# Patient Record
Sex: MALE
[De-identification: names, ages, dates, MRNs, and addresses within clinical notes are randomized per-mention and may not be internally consistent; named-entity substitution may affect disease eponyms.]

---

## 2017-10-15 ENCOUNTER — CHARTING TRANS (OUTPATIENT)
Dept: OTHER | Age: 72
End: 2017-10-15

## 2018-02-16 PROBLEM — N40.1 BPH WITH OBSTRUCTION/LOWER URINARY TRACT SYMPTOMS: Status: ACTIVE | Noted: 2018-02-16

## 2018-02-16 PROBLEM — N13.8 BPH WITH OBSTRUCTION/LOWER URINARY TRACT SYMPTOMS: Status: ACTIVE | Noted: 2018-02-16

## 2018-02-16 PROCEDURE — 86803 HEPATITIS C AB TEST: CPT | Performed by: FAMILY MEDICINE

## 2018-10-16 ENCOUNTER — APPOINTMENT (OUTPATIENT)
Dept: GENERAL RADIOLOGY | Facility: HOSPITAL | Age: 73
End: 2018-10-16
Attending: EMERGENCY MEDICINE
Payer: MEDICARE

## 2018-10-16 ENCOUNTER — HOSPITAL ENCOUNTER (EMERGENCY)
Facility: HOSPITAL | Age: 73
Discharge: HOME | End: 2018-10-16
Attending: EMERGENCY MEDICINE
Payer: MEDICARE

## 2018-10-16 VITALS
HEIGHT: 65 IN | OXYGEN SATURATION: 96 % | RESPIRATION RATE: 17 BRPM | DIASTOLIC BLOOD PRESSURE: 76 MMHG | TEMPERATURE: 98 F | HEART RATE: 57 BPM | BODY MASS INDEX: 27.32 KG/M2 | SYSTOLIC BLOOD PRESSURE: 156 MMHG | WEIGHT: 164 LBS

## 2018-10-16 DIAGNOSIS — R68.84 JAW PAIN: Primary | ICD-10-CM

## 2018-10-16 DIAGNOSIS — R42 LIGHTHEADED: ICD-10-CM

## 2018-10-16 PROCEDURE — 99285 EMERGENCY DEPT VISIT HI MDM: CPT

## 2018-10-16 PROCEDURE — 81003 URINALYSIS AUTO W/O SCOPE: CPT | Performed by: EMERGENCY MEDICINE

## 2018-10-16 PROCEDURE — 93010 ELECTROCARDIOGRAM REPORT: CPT

## 2018-10-16 PROCEDURE — 80053 COMPREHEN METABOLIC PANEL: CPT | Performed by: EMERGENCY MEDICINE

## 2018-10-16 PROCEDURE — 85025 COMPLETE CBC W/AUTO DIFF WBC: CPT | Performed by: EMERGENCY MEDICINE

## 2018-10-16 PROCEDURE — 93005 ELECTROCARDIOGRAM TRACING: CPT

## 2018-10-16 PROCEDURE — 71045 X-RAY EXAM CHEST 1 VIEW: CPT | Performed by: EMERGENCY MEDICINE

## 2018-10-16 PROCEDURE — 96360 HYDRATION IV INFUSION INIT: CPT

## 2018-10-16 PROCEDURE — 84484 ASSAY OF TROPONIN QUANT: CPT | Performed by: EMERGENCY MEDICINE

## 2018-10-16 RX ORDER — POTASSIUM CHLORIDE 20 MEQ/1
40 TABLET, EXTENDED RELEASE ORAL ONCE
Status: COMPLETED | OUTPATIENT
Start: 2018-10-16 | End: 2018-10-16

## 2018-10-16 RX ORDER — SODIUM CHLORIDE 9 MG/ML
125 INJECTION, SOLUTION INTRAVENOUS CONTINUOUS
Status: DISCONTINUED | OUTPATIENT
Start: 2018-10-16 | End: 2018-10-16

## 2018-10-16 RX ORDER — ASPIRIN 81 MG/1
324 TABLET, CHEWABLE ORAL ONCE
Status: COMPLETED | OUTPATIENT
Start: 2018-10-16 | End: 2018-10-16

## 2018-10-16 NOTE — ED INITIAL ASSESSMENT (HPI)
Patient presents with an episode of lightheadedness and diaphoresis that lasted about ten minutes. He states this occurred about 20 minutes prior to arrival and he continues to have a little lightheadedness. He states he just doesn't feel 100%.  Denies ches

## 2018-10-16 NOTE — ED PROVIDER NOTES
Patient Seen in: [facility] Emergency Department    History   Patient presents with:  Dizziness (neurologic)    Stated Complaint: Dizziness , diaphoresis that started 20min ago.  No chest pain    HPI    Patient is a 43-year-old male who states today LUMBAR/SACRAL, SINGLE LEVEL  12/4/2013    Procedure: TRANSFORAMINAL EPIDURAL - LUMBAR;  Surgeon: Janessa Hinkle MD;  Location: [facility]   • INJECTION, ANESTHETIC/STEROID, TRANSFORAMINAL EPIDURAL; LUMBAR/SACRAL, SINGLE LEVEL  12/30/ EPIDURAL - LUMBAR Left 12/4/2013    Performed by Avi Viveros MD at [address]   • TRANSFORAMINAL EPIDURAL - LUMBAR Left 11/8/2013    Performed by Avi Viveros MD at [address] Result Value    Glucose 115 (*)     Potassium 3.5 (*)     BUN 23 (*)     BUN/CREA Ratio 21.5 (*)     Calculated Osmolality 301 (*)     Alkaline Phosphatase 126 (*)     All other components within normal limits   TROPONIN I - Normal   CBC WITH DIFFERE

## 2018-12-14 PROBLEM — Z96.651 PRESENCE OF RIGHT ARTIFICIAL KNEE JOINT: Status: ACTIVE | Noted: 2018-11-27

## 2020-03-02 ENCOUNTER — ANESTHESIA EVENT (OUTPATIENT)
Dept: SURGERY | Facility: HOSPITAL | Age: 75
End: 2020-03-02
Payer: MEDICARE

## 2020-03-02 ENCOUNTER — APPOINTMENT (OUTPATIENT)
Dept: GENERAL RADIOLOGY | Facility: HOSPITAL | Age: 75
End: 2020-03-02
Attending: EMERGENCY MEDICINE
Payer: MEDICARE

## 2020-03-02 ENCOUNTER — ANESTHESIA (OUTPATIENT)
Dept: SURGERY | Facility: HOSPITAL | Age: 75
End: 2020-03-02
Payer: MEDICARE

## 2020-03-02 ENCOUNTER — HOSPITAL ENCOUNTER (EMERGENCY)
Facility: HOSPITAL | Age: 75
Discharge: HOME | End: 2020-03-02
Attending: EMERGENCY MEDICINE
Payer: MEDICARE

## 2020-03-02 ENCOUNTER — APPOINTMENT (OUTPATIENT)
Dept: GENERAL RADIOLOGY | Facility: HOSPITAL | Age: 75
End: 2020-03-02
Attending: ORTHOPAEDIC SURGERY
Payer: MEDICARE

## 2020-03-02 VITALS
HEART RATE: 70 BPM | RESPIRATION RATE: 15 BRPM | BODY MASS INDEX: 25.18 KG/M2 | HEIGHT: 69.02 IN | OXYGEN SATURATION: 98 % | SYSTOLIC BLOOD PRESSURE: 135 MMHG | TEMPERATURE: 98 F | WEIGHT: 170 LBS | DIASTOLIC BLOOD PRESSURE: 69 MMHG

## 2020-03-02 DIAGNOSIS — S62.634B OPEN DISPLACED FRACTURE OF DISTAL PHALANX OF RIGHT RING FINGER, INITIAL ENCOUNTER: Primary | ICD-10-CM

## 2020-03-02 DIAGNOSIS — S62.662B OPEN NONDISPLACED FRACTURE OF DISTAL PHALANX OF RIGHT MIDDLE FINGER, INITIAL ENCOUNTER: ICD-10-CM

## 2020-03-02 LAB
ALBUMIN SERPL-MCNC: 4 G/DL (ref 3.4–5)
ALBUMIN/GLOB SERPL: 1.1 {RATIO} (ref 1–2)
ALP LIVER SERPL-CCNC: 94 U/L (ref 45–117)
ALT SERPL-CCNC: 47 U/L (ref 16–61)
ANION GAP SERPL CALC-SCNC: 7 MMOL/L (ref 0–18)
APTT PPP: 24.5 SECONDS (ref 25.4–36.1)
AST SERPL-CCNC: 20 U/L (ref 15–37)
ATRIAL RATE: 66 BPM
BASOPHILS # BLD AUTO: 0.04 X10(3) UL (ref 0–0.2)
BASOPHILS NFR BLD AUTO: 0.7 %
BILIRUB SERPL-MCNC: 0.7 MG/DL (ref 0.1–2)
BUN BLD-MCNC: 51 MG/DL (ref 7–18)
BUN/CREAT SERPL: 26.3 (ref 10–20)
CALCIUM BLD-MCNC: 9 MG/DL (ref 8.5–10.1)
CHLORIDE SERPL-SCNC: 112 MMOL/L (ref 98–112)
CO2 SERPL-SCNC: 21 MMOL/L (ref 21–32)
CREAT BLD-MCNC: 1.94 MG/DL (ref 0.7–1.3)
DEPRECATED RDW RBC AUTO: 41.1 FL (ref 35.1–46.3)
EOSINOPHIL # BLD AUTO: 0.21 X10(3) UL (ref 0–0.7)
EOSINOPHIL NFR BLD AUTO: 3.6 %
ERYTHROCYTE [DISTWIDTH] IN BLOOD BY AUTOMATED COUNT: 13.2 % (ref 11–15)
GLOBULIN PLAS-MCNC: 3.6 G/DL (ref 2.8–4.4)
GLUCOSE BLD-MCNC: 116 MG/DL (ref 70–99)
HCT VFR BLD AUTO: 37.1 % (ref 39–53)
HGB BLD-MCNC: 13 G/DL (ref 13–17.5)
IMM GRANULOCYTES # BLD AUTO: 0.04 X10(3) UL (ref 0–1)
IMM GRANULOCYTES NFR BLD: 0.7 %
INR BLD: 1.02 (ref 0.9–1.1)
LYMPHOCYTES # BLD AUTO: 1.76 X10(3) UL (ref 1–4)
LYMPHOCYTES NFR BLD AUTO: 30.3 %
M PROTEIN MFR SERPL ELPH: 7.6 G/DL (ref 6.4–8.2)
MCH RBC QN AUTO: 30.5 PG (ref 26–34)
MCHC RBC AUTO-ENTMCNC: 35 G/DL (ref 31–37)
MCV RBC AUTO: 87.1 FL (ref 80–100)
MONOCYTES # BLD AUTO: 0.67 X10(3) UL (ref 0.1–1)
MONOCYTES NFR BLD AUTO: 11.6 %
NEUTROPHILS # BLD AUTO: 3.08 X10 (3) UL (ref 1.5–7.7)
NEUTROPHILS # BLD AUTO: 3.08 X10(3) UL (ref 1.5–7.7)
NEUTROPHILS NFR BLD AUTO: 53.1 %
OSMOLALITY SERPL CALC.SUM OF ELEC: 305 MOSM/KG (ref 275–295)
P AXIS: 14 DEGREES
P-R INTERVAL: 166 MS
PLATELET # BLD AUTO: 121 10(3)UL (ref 150–450)
POTASSIUM SERPL-SCNC: 4.3 MMOL/L (ref 3.5–5.1)
PSA SERPL DL<=0.01 NG/ML-MCNC: 13.8 SECONDS (ref 12.5–14.7)
Q-T INTERVAL: 388 MS
QRS DURATION: 84 MS
QTC CALCULATION (BEZET): 406 MS
R AXIS: 1 DEGREES
RBC # BLD AUTO: 4.26 X10(6)UL (ref 3.8–5.8)
SODIUM SERPL-SCNC: 140 MMOL/L (ref 136–145)
T AXIS: 38 DEGREES
VENTRICULAR RATE: 66 BPM
WBC # BLD AUTO: 5.8 X10(3) UL (ref 4–11)

## 2020-03-02 PROCEDURE — 71045 X-RAY EXAM CHEST 1 VIEW: CPT | Performed by: EMERGENCY MEDICINE

## 2020-03-02 PROCEDURE — 85025 COMPLETE CBC W/AUTO DIFF WBC: CPT | Performed by: EMERGENCY MEDICINE

## 2020-03-02 PROCEDURE — 93005 ELECTROCARDIOGRAM TRACING: CPT

## 2020-03-02 PROCEDURE — 90471 IMMUNIZATION ADMIN: CPT

## 2020-03-02 PROCEDURE — 0X6S0Z3 DETACHMENT AT RIGHT RING FINGER, LOW, OPEN APPROACH: ICD-10-PCS | Performed by: ORTHOPAEDIC SURGERY

## 2020-03-02 PROCEDURE — 85610 PROTHROMBIN TIME: CPT | Performed by: EMERGENCY MEDICINE

## 2020-03-02 PROCEDURE — 96361 HYDRATE IV INFUSION ADD-ON: CPT

## 2020-03-02 PROCEDURE — 96365 THER/PROPH/DIAG IV INF INIT: CPT

## 2020-03-02 PROCEDURE — 0X6Q0Z3 DETACHMENT AT RIGHT MIDDLE FINGER, LOW, OPEN APPROACH: ICD-10-PCS | Performed by: ORTHOPAEDIC SURGERY

## 2020-03-02 PROCEDURE — 99285 EMERGENCY DEPT VISIT HI MDM: CPT

## 2020-03-02 PROCEDURE — 85730 THROMBOPLASTIN TIME PARTIAL: CPT | Performed by: EMERGENCY MEDICINE

## 2020-03-02 PROCEDURE — 76000 FLUOROSCOPY <1 HR PHYS/QHP: CPT | Performed by: ORTHOPAEDIC SURGERY

## 2020-03-02 PROCEDURE — 96375 TX/PRO/DX INJ NEW DRUG ADDON: CPT

## 2020-03-02 PROCEDURE — 73130 X-RAY EXAM OF HAND: CPT | Performed by: EMERGENCY MEDICINE

## 2020-03-02 PROCEDURE — 93010 ELECTROCARDIOGRAM REPORT: CPT

## 2020-03-02 PROCEDURE — 80053 COMPREHEN METABOLIC PANEL: CPT | Performed by: EMERGENCY MEDICINE

## 2020-03-02 RX ORDER — CEFAZOLIN SODIUM/WATER 2 G/20 ML
SYRINGE (ML) INTRAVENOUS AS NEEDED
Status: DISCONTINUED | OUTPATIENT
Start: 2020-03-02 | End: 2020-03-02 | Stop reason: SURG

## 2020-03-02 RX ORDER — ONDANSETRON 2 MG/ML
4 INJECTION INTRAMUSCULAR; INTRAVENOUS AS NEEDED
Status: DISCONTINUED | OUTPATIENT
Start: 2020-03-02 | End: 2020-03-02

## 2020-03-02 RX ORDER — CEPHALEXIN 500 MG/1
500 CAPSULE ORAL 4 TIMES DAILY
Qty: 20 CAPSULE | Refills: 0 | Status: SHIPPED | OUTPATIENT
Start: 2020-03-02 | End: 2020-03-07

## 2020-03-02 RX ORDER — HYDROMORPHONE HYDROCHLORIDE 1 MG/ML
0.4 INJECTION, SOLUTION INTRAMUSCULAR; INTRAVENOUS; SUBCUTANEOUS EVERY 5 MIN PRN
Status: DISCONTINUED | OUTPATIENT
Start: 2020-03-02 | End: 2020-03-02

## 2020-03-02 RX ORDER — HYDROCODONE BITARTRATE AND ACETAMINOPHEN 5; 325 MG/1; MG/1
2 TABLET ORAL AS NEEDED
Status: DISCONTINUED | OUTPATIENT
Start: 2020-03-02 | End: 2020-03-02

## 2020-03-02 RX ORDER — EPHEDRINE SULFATE 50 MG/ML
INJECTION, SOLUTION INTRAVENOUS AS NEEDED
Status: DISCONTINUED | OUTPATIENT
Start: 2020-03-02 | End: 2020-03-02 | Stop reason: SURG

## 2020-03-02 RX ORDER — ACETAMINOPHEN 500 MG
1000 TABLET ORAL ONCE AS NEEDED
Status: DISCONTINUED | OUTPATIENT
Start: 2020-03-02 | End: 2020-03-02

## 2020-03-02 RX ORDER — MORPHINE SULFATE 4 MG/ML
4 INJECTION, SOLUTION INTRAMUSCULAR; INTRAVENOUS ONCE
Status: COMPLETED | OUTPATIENT
Start: 2020-03-02 | End: 2020-03-02

## 2020-03-02 RX ORDER — BUPIVACAINE HYDROCHLORIDE 5 MG/ML
INJECTION, SOLUTION EPIDURAL; INTRACAUDAL AS NEEDED
Status: DISCONTINUED | OUTPATIENT
Start: 2020-03-02 | End: 2020-03-02 | Stop reason: HOSPADM

## 2020-03-02 RX ORDER — KETOROLAC TROMETHAMINE 30 MG/ML
INJECTION, SOLUTION INTRAMUSCULAR; INTRAVENOUS AS NEEDED
Status: DISCONTINUED | OUTPATIENT
Start: 2020-03-02 | End: 2020-03-02 | Stop reason: SURG

## 2020-03-02 RX ORDER — ATORVASTATIN CALCIUM 40 MG/1
40 TABLET, FILM COATED ORAL NIGHTLY
COMMUNITY
End: 2020-10-07

## 2020-03-02 RX ORDER — LABETALOL HYDROCHLORIDE 5 MG/ML
5 INJECTION, SOLUTION INTRAVENOUS EVERY 5 MIN PRN
Status: DISCONTINUED | OUTPATIENT
Start: 2020-03-02 | End: 2020-03-02

## 2020-03-02 RX ORDER — ONDANSETRON 2 MG/ML
4 INJECTION INTRAMUSCULAR; INTRAVENOUS ONCE
Status: COMPLETED | OUTPATIENT
Start: 2020-03-02 | End: 2020-03-02

## 2020-03-02 RX ORDER — DEXAMETHASONE SODIUM PHOSPHATE 4 MG/ML
VIAL (ML) INJECTION AS NEEDED
Status: DISCONTINUED | OUTPATIENT
Start: 2020-03-02 | End: 2020-03-02 | Stop reason: SURG

## 2020-03-02 RX ORDER — HYDROCODONE BITARTRATE AND ACETAMINOPHEN 5; 325 MG/1; MG/1
1 TABLET ORAL AS NEEDED
Status: DISCONTINUED | OUTPATIENT
Start: 2020-03-02 | End: 2020-03-02

## 2020-03-02 RX ORDER — BENAZEPRIL HYDROCHLORIDE 40 MG/1
40 TABLET, FILM COATED ORAL DAILY
COMMUNITY
End: 2020-10-07

## 2020-03-02 RX ORDER — SODIUM CHLORIDE, SODIUM LACTATE, POTASSIUM CHLORIDE, CALCIUM CHLORIDE 600; 310; 30; 20 MG/100ML; MG/100ML; MG/100ML; MG/100ML
INJECTION, SOLUTION INTRAVENOUS CONTINUOUS PRN
Status: DISCONTINUED | OUTPATIENT
Start: 2020-03-02 | End: 2020-03-02 | Stop reason: SURG

## 2020-03-02 RX ORDER — NALOXONE HYDROCHLORIDE 0.4 MG/ML
80 INJECTION, SOLUTION INTRAMUSCULAR; INTRAVENOUS; SUBCUTANEOUS AS NEEDED
Status: DISCONTINUED | OUTPATIENT
Start: 2020-03-02 | End: 2020-03-02

## 2020-03-02 RX ORDER — HYDROCODONE BITARTRATE AND ACETAMINOPHEN 5; 325 MG/1; MG/1
1 TABLET ORAL EVERY 6 HOURS PRN
Qty: 30 TABLET | Refills: 0 | Status: SHIPPED | OUTPATIENT
Start: 2020-03-02 | End: 2020-11-06

## 2020-03-02 RX ORDER — SODIUM CHLORIDE 9 MG/ML
1000 INJECTION, SOLUTION INTRAVENOUS ONCE
Status: COMPLETED | OUTPATIENT
Start: 2020-03-02 | End: 2020-03-02

## 2020-03-02 RX ORDER — LIDOCAINE HYDROCHLORIDE 10 MG/ML
INJECTION, SOLUTION EPIDURAL; INFILTRATION; INTRACAUDAL; PERINEURAL AS NEEDED
Status: DISCONTINUED | OUTPATIENT
Start: 2020-03-02 | End: 2020-03-02 | Stop reason: SURG

## 2020-03-02 RX ORDER — AMLODIPINE BESYLATE 10 MG/1
10 TABLET ORAL DAILY
COMMUNITY
End: 2020-06-28

## 2020-03-02 RX ORDER — MIDAZOLAM HYDROCHLORIDE 1 MG/ML
1 INJECTION INTRAMUSCULAR; INTRAVENOUS EVERY 5 MIN PRN
Status: DISCONTINUED | OUTPATIENT
Start: 2020-03-02 | End: 2020-03-02

## 2020-03-02 RX ORDER — LIDOCAINE HYDROCHLORIDE 10 MG/ML
INJECTION, SOLUTION INFILTRATION; PERINEURAL AS NEEDED
Status: DISCONTINUED | OUTPATIENT
Start: 2020-03-02 | End: 2020-03-02 | Stop reason: HOSPADM

## 2020-03-02 RX ORDER — METOCLOPRAMIDE HYDROCHLORIDE 5 MG/ML
10 INJECTION INTRAMUSCULAR; INTRAVENOUS AS NEEDED
Status: DISCONTINUED | OUTPATIENT
Start: 2020-03-02 | End: 2020-03-02

## 2020-03-02 RX ORDER — MEPERIDINE HYDROCHLORIDE 25 MG/ML
12.5 INJECTION INTRAMUSCULAR; INTRAVENOUS; SUBCUTANEOUS AS NEEDED
Status: DISCONTINUED | OUTPATIENT
Start: 2020-03-02 | End: 2020-03-02

## 2020-03-02 RX ORDER — SODIUM CHLORIDE 9 MG/ML
125 INJECTION, SOLUTION INTRAVENOUS CONTINUOUS
Status: DISCONTINUED | OUTPATIENT
Start: 2020-03-02 | End: 2020-03-02

## 2020-03-02 RX ORDER — SODIUM CHLORIDE, SODIUM LACTATE, POTASSIUM CHLORIDE, CALCIUM CHLORIDE 600; 310; 30; 20 MG/100ML; MG/100ML; MG/100ML; MG/100ML
INJECTION, SOLUTION INTRAVENOUS CONTINUOUS
Status: DISCONTINUED | OUTPATIENT
Start: 2020-03-02 | End: 2020-03-02

## 2020-03-02 RX ADMIN — CEFAZOLIN SODIUM/WATER 2 G: 2 G/20 ML SYRINGE (ML) INTRAVENOUS at 15:34:00

## 2020-03-02 RX ADMIN — SODIUM CHLORIDE, SODIUM LACTATE, POTASSIUM CHLORIDE, CALCIUM CHLORIDE: 600; 310; 30; 20 INJECTION, SOLUTION INTRAVENOUS at 16:58:00

## 2020-03-02 RX ADMIN — KETOROLAC TROMETHAMINE 30 MG: 30 INJECTION, SOLUTION INTRAMUSCULAR; INTRAVENOUS at 16:49:00

## 2020-03-02 RX ADMIN — DEXAMETHASONE SODIUM PHOSPHATE 8 MG: 4 MG/ML VIAL (ML) INJECTION at 15:32:00

## 2020-03-02 RX ADMIN — EPHEDRINE SULFATE 10 MG: 50 INJECTION, SOLUTION INTRAVENOUS at 15:49:00

## 2020-03-02 RX ADMIN — LIDOCAINE HYDROCHLORIDE 50 MG: 10 INJECTION, SOLUTION EPIDURAL; INFILTRATION; INTRACAUDAL; PERINEURAL at 15:32:00

## 2020-03-02 RX ADMIN — SODIUM CHLORIDE, SODIUM LACTATE, POTASSIUM CHLORIDE, CALCIUM CHLORIDE: 600; 310; 30; 20 INJECTION, SOLUTION INTRAVENOUS at 15:28:00

## 2020-03-02 NOTE — ANESTHESIA POSTPROCEDURE EVALUATION
[address] [facility],[address] Patient Status:  Emergency   Age/Gender 69year old male MRN [id number]   [facility] SURGERY Attending Chago Santos, [address] [facility] Se Day # 0 PCP Suzette Balderas MD       Anesthesia Post-op Note    Procedure(s):  [facility]

## 2020-03-02 NOTE — ANESTHESIA PROCEDURE NOTES
Airway  Date/Time: 3/2/2020 3:36 PM  Urgency: elective      General Information and Staff    Patient location during procedure: OR  Anesthesiologist: Barb Mariscal MD  Performed: anesthesiologist     Indications and Patient Condition  Indications for a

## 2020-03-02 NOTE — ANESTHESIA PREPROCEDURE EVALUATION
PRE-OP EVALUATION    Patient Name: Hi Burciaga    Pre-op Diagnosis: Acquired deformity of finger due to trauma [M20.009]    Procedure(s):  RIGHT RING FINGER  WOUND EXPLORATION, POSSIBLE OPEN REDUCTION INTERNAL FIXATION VERSUS PARTIAL AMPUTATION    Surg (+) arthritis       Pulmonary    Negative pulmonary ROS.                        Neuro/Psych                 (+) neuromuscular disease             Allergic rhinitis due to pollen Essential hypertension, benign  Hyperlipidemia Lumbosacral sp 21.0 03/02/2020    BUN 51 (H) 03/02/2020    CREATSERUM 1.94 (H) 03/02/2020     (H) 03/02/2020    CA 9.0 03/02/2020     Lab Results   Component Value Date    INR 1.02 03/02/2020         Airway      Mallampati: II  Mouth opening: >3 FB  TM distance: >

## 2020-03-02 NOTE — ANESTHESIA POSTPROCEDURE EVALUATION
[address] [facility] Patient Status:  Emergency   Age/Gender 69year old male MRN [id number]   [facility] SURGERY Attending Kati Maxwell, [address] [facility] Se Day # 0 PCP Sree Yang MD       Anesthesia Post-op Note    Procedure(s):  [facility]

## 2020-03-02 NOTE — ED PROVIDER NOTES
Patient Seen in: [facility] Emergency Department      History   Patient presents with:  Trauma  Upper Extremity Injury    Stated Complaint: cut middle and 4th digit with a saw PTA.       HPI    Patient is a pleasant 52-year-old male, right-hand-domin Smokeless tobacco: Never Used    Alcohol use: Yes      Alcohol/week: 0.0 standard drinks    Drug use: No             Review of Systems    Positive for stated complaint: cut middle and 4th digit with a saw PTA. Other systems are as noted in HPI.   Con PTT, ACTIVATED - Abnormal; Notable for the following components:    PTT 24.5 (*)     All other components within normal limits   CBC W/ DIFFERENTIAL - Abnormal; Notable for the following components:    HCT 37.1 (*)     .0 (*)     All other component Patient was placed on the cart, an IV was established, and an infusion of normal saline was initiated. IV Ancef was ordered. X-ray of the right hand was obtained. Tetanus was updated. X-rays were reviewed.   Case was discussed with Dr. Marilee Frazier, who

## 2020-03-02 NOTE — OPERATIVE REPORT
OPERATIVE REPORT    Patient Name: Martin Richmond  Age:  69year old  Sex: male  MRN: [id number]  : 1945  Date of Admission: 3/2/2020  Date of Surgery: 20  Primary Surgeon: Zoila Amaya MD  Assistant(s): None     Preoperative Diagnosis:   Tabl high axillary tourniquet was placed and was well padded  Under sterile conditions, I performed a finger block to the right long and ring fingers around the MP joint.  I also gave him a median nerve and ulnar nerve block in safe and sterile fashion utilizing for the ulnar three fingers  The index finger was dressed and covered with a tubigauze that was loosely applied  Tourniquet was released prior to this and all fingers were warm and well perfused    The patient was allowed to awaken from the anesthetic, sarabjit

## 2020-03-02 NOTE — CONSULTS
ORTHOPAEDIC SURGERY CONSULT NOTE    Attending Physician: Stacy Swenson  Consulting Physician: Raquel Pereira MD    Patient Name: Alexsandra Ugalde  Age:  69year old  Sex: male  MRN: [id number]  : 1945  Date of Admission: 3/2/2020    REASON FOR CONSULTATION: Rfl: 1  •  amLODIPine Besylate 10 MG Oral Tab, TAKE 1 TABLET ONCE DAILY, Disp: 90 tablet, Rfl: 1  •  atorvastatin 40 MG Oral Tab, TAKE 1 TABLET ONCE DAILY, Disp: 90 tablet, Rfl: 1  •  Benazepril HCl 40 MG Oral Tab, TAKE ONE TABLET BY MOUTH DAILY, Disp: 90 saw injury    - I had a long discussion with the patient regarding his fingers. I believe his fingers need operative exploration tonight. His long finger injury is less severe.  I believe I can remove his nail, repair his lacerations and repair his extensor

## 2020-11-06 PROBLEM — S62.634B OPEN DISPLACED FRACTURE OF DISTAL PHALANX OF RIGHT RING FINGER, INITIAL ENCOUNTER: Status: RESOLVED | Noted: 2020-03-02 | Resolved: 2020-11-06

## 2020-11-06 PROBLEM — S62.662B: Status: RESOLVED | Noted: 2020-03-02 | Resolved: 2020-11-06

## 2021-04-05 ENCOUNTER — HOSPITAL ENCOUNTER (INPATIENT)
Facility: HOSPITAL | Age: 76
LOS: 4 days | Discharge: HOME | DRG: 683 | End: 2021-04-09
Attending: INTERNAL MEDICINE | Admitting: INTERNAL MEDICINE
Payer: MEDICARE

## 2021-04-05 ENCOUNTER — APPOINTMENT (OUTPATIENT)
Dept: INTERVENTIONAL RADIOLOGY/VASCULAR | Facility: HOSPITAL | Age: 76
DRG: 683 | End: 2021-04-05
Attending: INTERNAL MEDICINE
Payer: MEDICARE

## 2021-04-05 DIAGNOSIS — D64.9 ANEMIA, UNSPECIFIED TYPE: ICD-10-CM

## 2021-04-05 DIAGNOSIS — E87.2 METABOLIC ACIDOSIS: ICD-10-CM

## 2021-04-05 DIAGNOSIS — D69.6 THROMBOCYTOPENIA (HCC): ICD-10-CM

## 2021-04-05 DIAGNOSIS — E87.5 HYPERKALEMIA: Primary | ICD-10-CM

## 2021-04-05 DIAGNOSIS — R79.89 AZOTEMIA: ICD-10-CM

## 2021-04-05 PROCEDURE — 99291 CRITICAL CARE FIRST HOUR: CPT | Performed by: NURSE PRACTITIONER

## 2021-04-05 PROCEDURE — 5A1D70Z PERFORMANCE OF URINARY FILTRATION, INTERMITTENT, LESS THAN 6 HOURS PER DAY: ICD-10-PCS | Performed by: INTERNAL MEDICINE

## 2021-04-05 PROCEDURE — 02HV33Z INSERTION OF INFUSION DEVICE INTO SUPERIOR VENA CAVA, PERCUTANEOUS APPROACH: ICD-10-PCS | Performed by: RADIOLOGY

## 2021-04-05 PROCEDURE — B548ZZA ULTRASONOGRAPHY OF SUPERIOR VENA CAVA, GUIDANCE: ICD-10-PCS | Performed by: RADIOLOGY

## 2021-04-05 RX ORDER — HEPARIN SODIUM 1000 [USP'U]/ML
2000 INJECTION, SOLUTION INTRAVENOUS; SUBCUTANEOUS
Status: DISCONTINUED | OUTPATIENT
Start: 2021-04-05 | End: 2021-04-09

## 2021-04-05 RX ORDER — ATORVASTATIN CALCIUM 40 MG/1
40 TABLET, FILM COATED ORAL NIGHTLY
COMMUNITY
End: 2021-10-28

## 2021-04-05 RX ORDER — CHOLECALCIFEROL (VITAMIN D3) 50 MCG
2000 TABLET ORAL DAILY
COMMUNITY

## 2021-04-05 RX ORDER — DEXTROSE MONOHYDRATE 25 G/50ML
50 INJECTION, SOLUTION INTRAVENOUS ONCE
Status: COMPLETED | OUTPATIENT
Start: 2021-04-05 | End: 2021-04-05

## 2021-04-05 RX ORDER — SODIUM POLYSTYRENE SULFONATE 4.1 MEQ/G
30 POWDER, FOR SUSPENSION ORAL; RECTAL ONCE
Status: COMPLETED | OUTPATIENT
Start: 2021-04-05 | End: 2021-04-05

## 2021-04-05 RX ORDER — HEPARIN SODIUM 5000 [USP'U]/ML
5000 INJECTION, SOLUTION INTRAVENOUS; SUBCUTANEOUS EVERY 8 HOURS SCHEDULED
Status: DISCONTINUED | OUTPATIENT
Start: 2021-04-05 | End: 2021-04-08

## 2021-04-05 RX ORDER — ASPIRIN 81 MG/1
81 TABLET ORAL NIGHTLY
Status: DISCONTINUED | OUTPATIENT
Start: 2021-04-05 | End: 2021-04-09

## 2021-04-05 RX ORDER — SODIUM CHLORIDE 9 MG/ML
INJECTION, SOLUTION INTRAVENOUS CONTINUOUS
Status: ACTIVE | OUTPATIENT
Start: 2021-04-05 | End: 2021-04-05

## 2021-04-05 RX ORDER — SODIUM CHLORIDE 9 MG/ML
INJECTION, SOLUTION INTRAVENOUS CONTINUOUS
Status: DISCONTINUED | OUTPATIENT
Start: 2021-04-05 | End: 2021-04-08

## 2021-04-05 RX ORDER — DEXTROSE MONOHYDRATE 25 G/50ML
INJECTION, SOLUTION INTRAVENOUS
Status: COMPLETED
Start: 2021-04-05 | End: 2021-04-05

## 2021-04-05 RX ORDER — ATORVASTATIN CALCIUM 40 MG/1
40 TABLET, FILM COATED ORAL NIGHTLY
Status: DISCONTINUED | OUTPATIENT
Start: 2021-04-05 | End: 2021-04-09

## 2021-04-05 RX ORDER — SODIUM POLYSTYRENE SULFONATE 4.1 MEQ/G
60 POWDER, FOR SUSPENSION ORAL; RECTAL ONCE
Status: DISCONTINUED | OUTPATIENT
Start: 2021-04-05 | End: 2021-04-05

## 2021-04-05 RX ORDER — AMLODIPINE BESYLATE 5 MG/1
10 TABLET ORAL DAILY
Status: DISCONTINUED | OUTPATIENT
Start: 2021-04-06 | End: 2021-04-09

## 2021-04-05 RX ORDER — HEPARIN SODIUM 5000 [USP'U]/ML
INJECTION, SOLUTION INTRAVENOUS; SUBCUTANEOUS
Status: COMPLETED
Start: 2021-04-05 | End: 2021-04-05

## 2021-04-05 RX ORDER — LIDOCAINE HYDROCHLORIDE 10 MG/ML
INJECTION, SOLUTION INFILTRATION; PERINEURAL
Status: COMPLETED
Start: 2021-04-05 | End: 2021-04-05

## 2021-04-05 NOTE — H&P
General Medicine H&P     Patient presents with:  Abnormal Result       PCP: Dominick Thayer MD    History of Present Illness: Patient is a 77year old male with PMH including but not limited to BPH, LUTS, HTN who p/t [facility] ED c progressive weakness and found Performed by Nathan Conrad MD at [facility]   • TRANSFORAMINAL EPIDURAL - LUMBAR Left 11/8/2013    Performed by Nathan Conrad MD at [facility]   • TRANSURETHRAL ELEC-SURG PROSTATECTOM          ALL:    Jocelynn Cleary PLAN:    77year old male with PMH including but not limited to BPH, LUTS, HTN who p/t [facility] ED c progressive weakness and found to have ARF and hyperkalemia.      # Severe hyperkalemia 7.37  - d/w Dr. Deandre Huizar, plan for urgent dialysis as some lengthening of QR i

## 2021-04-05 NOTE — ED QUICK NOTES
PT REFUSING [place] CATH STATES HE STRAIGHT CATHS HIMSELF INTERMITTENTLY AT HOME, [address]

## 2021-04-05 NOTE — ED INITIAL ASSESSMENT (HPI)
PT PRESENTS TO ED WITH FATIGUE FOR A FEW MONTHS, WENT TO PRIMARY MD THIS MORNING AND WAS TOLD HE HAS AN ELEVATED POTASSIUM AND A LOW HEMOGLOBIN

## 2021-04-05 NOTE — CONSULTS
[facility]    Report of Consultation    Luc Gregory Patient Status:  Emergency    1945 MRN [id number]   Location [address] [facility] Attending Zeeshan Arriaga MD   Hosp Day # 0 PCP MD Klaus Taylor [facility] hypertension      Past Surgical History:   Procedure Laterality Date   • COLONOSCOPY  12/4/2009    normal, Dr Sudhir Vale, rpt 10 yrs   • Luke Sherman  04/20/2018   • FINGER OPEN REDUCTION INTERNAL FIXATION Right 3/2/2020    Performed by T daily.  atorvastatin 40 MG Oral Tab, Take 1 tablet (40 mg total) by mouth daily. amLODIPine Besylate 10 MG Oral Tab, Take 1 tablet (10 mg total) by mouth daily. spironolactone 50 MG Oral Tab, Take 1 tablet (50 mg total) by mouth daily.   Benazepril HCl 40 Value Date    WBC 7.0 04/05/2021    RBC 3.70 (L) 04/05/2021    HGB 11.3 (L) 04/05/2021    HCT 34.0 (L) 04/05/2021    .0 04/05/2021    MCV 91.9 04/05/2021    MCH 30.5 04/05/2021    MCHC 33.2 04/05/2021    RDW 13.2 04/05/2021    NEPRELIM 4.42 04/05/20 bicarbonate  -- HD today with 40 HCO3 bath    Anemia:  -- check SPEP, iron stores    MBD:  -check phos, iPTH, Vit D    HTN:  -- BP acceptable currently  -- monitor off BP meds for now    Critical care time > 35 min.   Discussed with Dr. Myesha Hayes and patient

## 2021-04-05 NOTE — ED PROVIDER NOTES
Patient Seen in: [facility] Emergency Department      History   Patient presents with:  Abnormal Result    Stated Complaint: Elevated potassium    HPI/Subjective:   HPI    78-year-old male was sent to the emergency department by his primary care oscar LUMBAR Left 12/30/2013    Performed by Helane Closs, MD at [facility]   • TRANSFORAMINAL EPIDURAL - LUMBAR Left 12/4/2013    Performed by Helane Closs, MD at [facility]   • TRANSFORAMINAL EPIDURAL - LUMBAR Ángela Wagner components within normal limits   COMP METABOLIC PANEL (14) - Abnormal; Notable for the following components:    Glucose 112 (*)     Potassium 7.8 (*)     Chloride 118 (*)     CO2 15.0 (*)     BUN 64 (*)     Creatinine 2.61 (*)     BUN/CREA Ratio 24.5 (*) was some concern as to whether or not he may have had urinary retention). The patient remained hemodynamically stable throughout the course the emergency department stay.     A total of 31 minutes of critical care time (exclusive of billable procedures)

## 2021-04-06 ENCOUNTER — APPOINTMENT (OUTPATIENT)
Dept: ULTRASOUND IMAGING | Facility: HOSPITAL | Age: 76
DRG: 683 | End: 2021-04-06
Attending: INTERNAL MEDICINE
Payer: MEDICARE

## 2021-04-06 PROCEDURE — 76770 US EXAM ABDO BACK WALL COMP: CPT | Performed by: INTERNAL MEDICINE

## 2021-04-06 RX ORDER — DEXTROSE MONOHYDRATE 25 G/50ML
50 INJECTION, SOLUTION INTRAVENOUS ONCE
Status: COMPLETED | OUTPATIENT
Start: 2021-04-06 | End: 2021-04-06

## 2021-04-06 RX ORDER — CALCITRIOL 0.25 UG/1
0.25 CAPSULE, LIQUID FILLED ORAL EVERY OTHER DAY
Status: DISCONTINUED | OUTPATIENT
Start: 2021-04-06 | End: 2021-04-09

## 2021-04-06 NOTE — PROCEDURES
[address] [facility],[address] Patient Status:  Emergency    1945 MRN [id number]   Location [address] Attending No att. providers found   Hosp Day # 0 PCP Gregor Mckenna MD         Brief Procedure Report    Samantha Guerra

## 2021-04-06 NOTE — PROGRESS NOTES
ICU  Critical Care APRN Progress Note    NAME: Agnes Devine - ROOM: 120/[facility] - MRN: [id number] - Age: 77year old - :1945    History Of Present Illness:  Agnes Devine is a 77year old male with PMHx significant for BPH, HTN and CKD 3 who went DYSTONIA, s/p deep brain stimulation   • Cancer Maternal Grandmother         lung? • Arthritis Sister         spinal stenosis         Review of Systems:   A comprehensive 10 point review of systems was completed.   Pertinent positives and negatives noted 04/05/2021       Assessment/Plan:  1. Hyperkalemia  -Insulin, Kayexalate, bicarb, Calcium gluconate  -HD stat  -Renal consult    2, KOSTA on CKD stage 3   -Stat HD  -IVF per Renal  -Renal US, urine studies  -avoid nephrotoxic agents    3.  Metabolic acidosis

## 2021-04-06 NOTE — CONSULTS
Pulmonary Consult     Assessment / Plan:  1. Hyperkalemia  - s/p insulin, kayexalate and bicarb   - urgent HD overnight with improvement   - monitor K  2.  Acute kidney injury on CKD  - stage 3 CKD with last Cr 1.90 in 3/2020  - HD overnight   - renal on co REDUCTION INTERNAL FIXATION Right 3/2/2020    Performed by Kalli Lowe MD at [facility] MAIN OR   • HERNIA SURGERY  8 YO    RT INGUINAL   • LITHOTRIPSY  11/05   • SKIN SURGERY  07/31/2012    BCC nodular / vertex scalp / Mohs surgery by Dr. Tiana Garcia   • TON 3  spironolactone 50 MG Oral Tab, Take 1 tablet (50 mg total) by mouth daily. , Disp: 90 tablet, Rfl: 3  Benazepril HCl 40 MG Oral Tab, Take 1 tablet (40 mg total) by mouth daily. , Disp: 90 tablet, Rfl: 3  aspirin 81 MG Oral Tab, Take 81 mg by mouth nightly questions appropriately, appropriate affect    Labs:  Reviewed in EMR    Inpatient Medications:  Reviewed in EMR    Imaging:   Chest imaging reviewed

## 2021-04-06 NOTE — PLAN OF CARE
Assumed care of patient at 0730. Patient A/Ox4. Breath sounds clear on RA. VSS, NSR on the monitor. With activity HR in 120's. Patient denies pain. Ambulating to the bathroom. Temporary dialysis catheter C/D/I.  Plan of care reviewed with the patient and hi nutrition consult as needed  - Instruct patient on self management of diabetes  Outcome: Progressing  Goal: Electrolytes maintained within normal limits  Description: INTERVENTIONS:  - Monitor labs and rhythm and assess patient for signs and symptoms of el

## 2021-04-06 NOTE — PLAN OF CARE
Received pt from ED. A&Ox4, follows commands. On RA. NSR w/ 1st degree AVB. Afebrile. SCDs on. Bowel sounds present, 1 BM. UA adequate. Difficulty initiating stream d/t bph, baseline for pt. Denies pain. Hemodialysis from [id number]. No complications.  Pot

## 2021-04-06 NOTE — PROCEDURES
[facility]  Pre-Procedure Note    Name: Ronaldo Oakley  MRN#: [id number]  : 1945    Procedure:  Ultrasound and Fluoro Temporary Dialysis Catheter Placement    Indication: Acute Kidney Injury  Chronic Kidney Disease requiring HD Hyperkalemia

## 2021-04-06 NOTE — PROGRESS NOTES
[facility] Hospitalist Progress Note     PCP: Flor Rosa MD    Chief Complaint: follow-up    Overnight/Interim Events:      SUBJECTIVE:  Getting ultrasound, no pain/n/v/f/c. No cp/sob/palpitations. Hard to gauge energy level as in bed.      OBJECTIVE:  Temp 0.25 mcg Oral QOD   • Heparin Sodium (Porcine)  5,000 Units Subcutaneous Q8H [facility] & assisted   • aspirin  81 mg Oral Nightly   • atorvastatin  40 mg Oral Nightly   • amLODIPine Besylate  10 mg Oral Daily     • sodium chloride 125 mL/hr at 04/06/21 1253     heparin sod

## 2021-04-06 NOTE — PROGRESS NOTES
[facility]    Nephrology Progress Note    Kyle Lane Attending:   Jane Lopez MD       SUBJECTIVE:     S/p HD yesterday  No complications during treatment  No leg swelling  No urinary complaints    PHYSICAL EXAM:     Vital Signs: /6 BAPERCENT 0.6 04/06/2021    NE 3.23 04/06/2021    LYMABS 1.19 04/06/2021    MOABSO 0.67 04/06/2021    EOABSO 0.12 04/06/2021    BAABSO 0.03 04/06/2021     Lab Results   Component Value Date    MALBP 1.30 04/06/2021    CREUR 82.20 04/06/2021    CREUR 75. RAAS blocking agents     Acidosis:  -- rule out obstruction with renal US  -- s/p bicarbonate and HD     Anemia:  -- check SPEP  -- iron stores acceptable, defer ROYAL until we have SPEP results    Secondary hyperparathyroidism:  - start calcitriol 0.25 mcg

## 2021-04-07 RX ORDER — DEXTROSE MONOHYDRATE 25 G/50ML
50 INJECTION, SOLUTION INTRAVENOUS ONCE
Status: COMPLETED | OUTPATIENT
Start: 2021-04-07 | End: 2021-04-07

## 2021-04-07 NOTE — PLAN OF CARE
Problem: Patient/Family Goals  Goal: Patient/Family Long Term Goal  Description: Patient's Long Term Goal: To go home    Interventions:  - Dialysis  - Ambulate regularly  - Follow diet  - Participate in care.   - See additional Care Plan goals for specifi maintained  Description: INTERVENTIONS:  - Monitor labs and assess for signs and symptoms of volume excess or deficit  - Monitor intake, output and patient weight  - Monitor urine specific gravity, serum osmolarity and serum sodium as indicated or ordered

## 2021-04-07 NOTE — PROGRESS NOTES
[facility] Hospitalist Progress Note     PCP: Hnoey Coles MD    Chief Complaint: follow-up    Overnight/Interim Events:      SUBJECTIVE:   Doing ok today. Denies cp sob. K still elevated today     OBJECTIVE:  Temp:  [97.9 °F (36.6 °C)-98.6 °F (37 °C)] 98. 5 Units Subcutaneous Q8H [facility] & long term   • aspirin  81 mg Oral Nightly   • atorvastatin  40 mg Oral Nightly   • amLODIPine Besylate  10 mg Oral Daily     • sodium chloride 125 mL/hr at 04/07/21 0450     heparin sodium       Assessment/Plan:     77year old male with P

## 2021-04-07 NOTE — PROGRESS NOTES
Critical Care Progress Note     Assessment / Plan:  1. Hyperkalemia  - s/p insulin, kayexalate and bicarb   - HD again this am   - monitor K  2.  Acute kidney injury on CKD  - stage 3 CKD with last Cr 1.90 in 3/2020  - HD per renal   - renal on consult   -

## 2021-04-07 NOTE — PROGRESS NOTES
[facility]    Nephrology Progress Note    Latasha Gurrola Attending:  Srinivas De Los Santos DO       SUBJECTIVE:     No urinary complaints  No leg swelling  K 6 this morning, tolerated 2.5 hours HD     PHYSICAL EXAM:     Vital Signs: /56   Pulse 73 3.25 04/07/2021    LYMABS 1.18 04/07/2021    MOABSO 0.61 04/07/2021    EOABSO 0.19 04/07/2021    BAABSO 0.03 04/07/2021     Lab Results   Component Value Date    MALBP 1.30 04/06/2021    CREUR 82.20 04/06/2021    CREUR 75.80 04/06/2021     Lab Results   Co saline at 125   -- hold RAAS blocking agents     Acidosis:  -- s/p bicarbonate and HD     Anemia:  -- check SPEP  -- iron stores acceptable, defer ROYAL until we have SPEP results, further information surrounding bladder mass on renal US     Secondary hyperp

## 2021-04-07 NOTE — PLAN OF CARE
Received patient after report. Patient resting in bed on room air. Ambulates to bathroom without complication. Urinal at bedside. Occasional runs of tachycardia w/ stimulation, nonsustained and w/o complication. Temp HD cath in place, deaccessed.  Transfer

## 2021-04-07 NOTE — CONSULTS
[facility] Urology   Consultation Note    Natalia Chin Patient Status:  Inpatient    1945 MRN [id number]   [facility] 4SW-A Attending Shailesh Zhao MD   [facility] Day # 2 PCP Susan Vidales MD     Reason for Consultation Whitley Vines MD at [address] [facility] OR   • HERNIA SURGERY  6 YO    RT INGUINAL   • LITHOTRIPSY  11/05   • SKIN SURGERY  07/31/2012    BCC nodular / vertex scalp / Mohs surgery by Dr. Renee Ganser   • TONSILLECTOMY      removed in childhood    • TRANSFORAMINAL EPIDURA Systems:  Pertinent items are noted in HPI. Physical Exam:  /60   Pulse 62   Temp 98.5 °F (36.9 °C) (Temporal)   Resp 15   Ht 5' 5\" (1.651 m)   Wt 160 lb (72.6 kg)   SpO2 97%   BMI 26.63 kg/m²   GENERAL: Patient resting comfortably in bed.    HEEN Hyperlipidemia     Lumbosacral spondylosis without myelopathy     Degeneration of lumbar or lumbosacral intervertebral disc     Sciatica     Spinal stenosis, lumbar region, without neurogenic claudication     Heart murmur     BPH with obstruction/lower Suzie Basque

## 2021-04-08 NOTE — PLAN OF CARE
Received patient after report. Patient resting in bed on room air. Denies pain. Voids per urinal. Ambulates without complication. Transfer orders in place, awaiting available bed. Updated patient on plan of care. Will continue to monitor.      0530 Bed avai

## 2021-04-08 NOTE — PROGRESS NOTES
[facility]    Nephrology Progress Note    Agnes Devine Attending:  Lus Bloch, DO       SUBJECTIVE:     No urinary complaints  No sob or leg swelling    PHYSICAL EXAM:     Vital Signs: /66 (BP Location: Right arm)   Pulse 58   Temp 97. 9 LYMABS 1.24 04/08/2021    MOABSO 0.57 04/08/2021    EOABSO 0.21 04/08/2021    BAABSO 0.03 04/08/2021     Lab Results   Component Value Date    MALBP 1.30 04/06/2021    CREUR 82.20 04/06/2021    CREUR 75.80 04/06/2021     Lab Results   Component Value Da bicarbonate and HD     Anemia:  -- check SPEP, hemolysis labs (see below)  -- iron stores acceptable, defer ROYAL until we have SPEP results, further information surrounding bladder mass on renal US     Secondary hyperparathyroidism:  - started calcitriol 0.

## 2021-04-08 NOTE — PLAN OF CARE
Assumed pt care @ 0730. Alert & oriented x 4. VSS. Denies pain. Right temporary HD cath dressing CDI. On room air. NSR on telemetry. SCD's. Tolerating diet. Voiding well. Up ambulating in room & in falcon. Steady gait noted. Will continue to monitor.      Pro replacements, including rhythm and repeat lab results as appropriate  - Fluid restriction as ordered  - Instruct patient on fluid and nutrition restrictions as appropriate  Outcome: Progressing  Goal: Hemodynamic stability and optimal renal function mainta

## 2021-04-08 NOTE — PLAN OF CARE
NURSING ADMISSION NOTE      Patient admitted via Cart  Oriented to room. Safety precautions initiated. Bed in low position. Call light in reach. Vital signs stable. Patient resting comfortably. Recent potassium lab value is 4.8.

## 2021-04-08 NOTE — PROGRESS NOTES
[facility] Hospitalist Progress Note     PCP: Fiordaliza Wing MD    Chief Complaint: follow-up    Overnight/Interim Events:      SUBJECTIVE: pt doing well. No cp, sob, n/v or dizziness.       OBJECTIVE:  Temp:  [97.8 °F (36.6 °C)-99.1 °F (37.3 °C)] 99 °F (37.2 ° --   --   --  19 19 26   ALB 4.7 4.1   < > 3.5 3.5 3.0* 3.2* 2.9*   LDH  --   --   --   --   --   --   --  137    < > = values in this interval not displayed.        Recent Labs   Lab 04/05/21  1631 04/05/21  1738 04/05/21  1757 04/05/21  2202 04/06/21  143

## 2021-04-09 VITALS
BODY MASS INDEX: 26.71 KG/M2 | SYSTOLIC BLOOD PRESSURE: 130 MMHG | RESPIRATION RATE: 18 BRPM | HEART RATE: 74 BPM | TEMPERATURE: 98 F | DIASTOLIC BLOOD PRESSURE: 49 MMHG | HEIGHT: 65 IN | OXYGEN SATURATION: 99 % | WEIGHT: 160.31 LBS

## 2021-04-09 RX ORDER — CALCITRIOL 0.25 UG/1
0.25 CAPSULE, LIQUID FILLED ORAL
Qty: 36 CAPSULE | Refills: 3 | Status: SHIPPED | OUTPATIENT
Start: 2021-04-09 | End: 2021-05-04

## 2021-04-09 NOTE — PLAN OF CARE
Pt is aox4. States no pain. Pt walked in hallway. Medicated per orders. IV saline locked. No complaints of dizziness or short of breath.      Problem: Patient/Family Goals  Goal: Patient/Family Long Term Goal  Description: Patient's Long Term Goal: To go on fluid and nutrition restrictions as appropriate  Outcome: Progressing  Goal: Hemodynamic stability and optimal renal function maintained  Description: INTERVENTIONS:  - Monitor labs and assess for signs and symptoms of volume excess or deficit  - Monito

## 2021-04-09 NOTE — PROGRESS NOTES
[facility]    Nephrology Progress Note    Cheyenne Dinh Attending:  Christa Chaudhari DO       SUBJECTIVE:     No complaints  Eating lunch    PHYSICAL EXAM:     Vital Signs: /49 (BP Location: Left arm)   Pulse 74   Temp 97.5 °F (36.4 °C) (Oral MOABSO 0.66 04/09/2021    EOABSO 0.26 04/09/2021    BAABSO 0.04 04/09/2021     Lab Results   Component Value Date    MALBP 1.30 04/06/2021    CREUR 82.20 04/06/2021    CREUR 75.80 04/06/2021     Lab Results   Component Value Date    COLORUR Straw 04/06/ surrounding bladder mass on renal US     Secondary hyperparathyroidism:  - started calcitriol 0.25 mcg every other day, continue on discharge     Hypophosphatemia:  -- improved with repletion     HTN:  -- BP acceptable currently on amlodipine monotherapy

## 2021-04-09 NOTE — PROGRESS NOTES
Patient was provided with discharge instructions, education, and follow up information. Prescriptions were already sent electronically to patient's pharmacy.  Patient verbalizes understanding of follow up information, specifically medication changes for dis

## 2021-04-09 NOTE — DISCHARGE SUMMARY
General Medicine Discharge Summary     Patient ID:  Fernanda Goode  77year old  6/5/1945    Admit date: 4/5/2021    Discharge date and time: 4/9/21    Attending Physician: Nick Cordero DO bicarb and HD     # HTN  - resume norvasc      # HL  -statin     # Anemia  - 2/2 CKD, check SPEP - pending  -LDH, haptoglobin, retic wnl     #Thrombocytopenia  -normal PTA, check HIPA - pending  -plt improved today      # BPH  - self-caths     Consults: IP

## 2021-10-15 PROBLEM — M54.16 LUMBAR RADICULOPATHY: Status: ACTIVE | Noted: 2021-10-15

## 2021-12-15 PROBLEM — E87.2 METABOLIC ACIDOSIS: Status: RESOLVED | Noted: 2021-04-05 | Resolved: 2021-12-15

## 2021-12-15 PROBLEM — E87.5 HYPERKALEMIA: Status: RESOLVED | Noted: 2021-04-05 | Resolved: 2021-12-15

## 2025-06-01 NOTE — CM/SW NOTE
04/07/21 1100   CM/SW Referral Data   Referral Source Social Work (self-referral)   Reason for Referral Discharge planning   Informant Patient;Spouse   Patient Info   Patient's Mental Status Alert;Oriented   Patient's [address]   Patient li English

## (undated) DEVICE — 1010 S-DRAPE TOWEL DRAPE 10/BX: Brand: STERI-DRAPE™

## (undated) DEVICE — DRAPE MINI C-ARM

## (undated) DEVICE — REM POLYHESIVE ADULT PATIENT RETURN ELECTRODE: Brand: VALLEYLAB

## (undated) DEVICE — SPLINT PLASTER 3

## (undated) DEVICE — PADDING CAST COTTON STER 3

## (undated) DEVICE — DISPOSABLE BIPOLAR FORCEPS 4" (10.2CM) JEWELERS, STRAIGHT 0.4MM TIP AND 12 FT. (3.6M) CABLE: Brand: KIRWAN

## (undated) DEVICE — GAMMEX® PI HYBRID SIZE 7, STERILE POWDER-FREE SURGICAL GLOVE, POLYISOPRENE AND NEOPRENE BLEND: Brand: GAMMEX

## (undated) DEVICE — SOL  .9 1000ML BTL

## (undated) DEVICE — OINTMENT BACITRACIN PKT

## (undated) DEVICE — STOCKINETTE HYDROMED 4\" 704066

## (undated) DEVICE — UPPER EXTREMITY CDS-LF: Brand: MEDLINE INDUSTRIES, INC.

## (undated) DEVICE — SUTURE ETHILON 4-0 PS-2

## (undated) DEVICE — GAUZE SPONGES,USP TYPE VII GAUZE, 12 PLY: Brand: CURITY

## (undated) DEVICE — KENDALL SCD EXPRESS SLEEVES, KNEE LENGTH, MEDIUM: Brand: KENDALL SCD

## (undated) DEVICE — STANDARD HYPODERMIC NEEDLE,POLYPROPYLENE HUB: Brand: MONOJECT

## (undated) DEVICE — SYRINGE 20CC LL TIP

## (undated) DEVICE — ZIMMER® STERILE DISPOSABLE TOURNIQUET CUFF WITH PLC, DUAL PORT, SINGLE BLADDER, 18 IN. (46 CM)

## (undated) DEVICE — GAMMEX® PI HYBRID SIZE 7.5, STERILE POWDER-FREE SURGICAL GLOVE, POLYISOPRENE AND NEOPRENE BLEND: Brand: GAMMEX

## (undated) NOTE — LETTER
Mable Cruz [address] [address]  [place], [address] [facility]    Consent for Operation  Date: __________________                                Time: _______________    1.  I authorize the performance [facility] the following operation:  Procedure revealed by the pictures or by descriptive texts accompanying them. If the procedure has been videotaped, the surgeon will obtain the original videotape. The hospital will not be responsible for storage or maintenance of this tape.   7. For the purpose of a THAT MY DOCTOR PROVIDED ME WITH THE ABOVE EXPLANATIONS, THAT ALL BLANKS OR STATEMENTS REQUIRING INSERTION OR COMPLETION WERE FILLED IN.     Signature of Patient:   ___________________________    When the patient is a minor or mentally incompetent to give co iii. All of the medicines I take (including prescriptions, herbal supplements, and pills I can buy without a prescription (including street drugs/illegal medications).  Failure to inform my anesthesiologist about these medicines may increase my risk of anes _____________________________________________________________________________  Anesthesiologist Signature     Date   Time  I have discussed the procedure and information above with the patient (or patient’s representative) and answered their questions.  The

## (undated) NOTE — LETTER
[facility] [address], [address]  Consent for Procedure/Sedation    Date: 4/5/21    Time:       1. I authorize the performance upon Heron Wade the following:  Temporary Dialysis Catheter Insertion     2.  I authorize Dr. Diane Calderon 6/5/1945       Medical Record #: [id number]

## (undated) NOTE — LETTER
Mable Cruz [address] [address]  [place], [address] [facility]    Consent for Operation  Date: __________________                                Time: _______________    1.  I authorize the performance upon [facility] [place] the following operation:  Procedure revealed by the pictures or by descriptive texts accompanying them. If the procedure has been videotaped, the surgeon will obtain the original videotape. The hospital will not be responsible for storage or maintenance of this tape.   7. For the purpose of a THAT MY DOCTOR PROVIDED ME WITH THE ABOVE EXPLANATIONS, THAT ALL BLANKS OR STATEMENTS REQUIRING INSERTION OR COMPLETION WERE FILLED IN.     Signature of Patient:   ___________________________    When the patient is a minor or mentally incompetent to give co iii. All of the medicines I take (including prescriptions, herbal supplements, and pills I can buy without a prescription (including street drugs/illegal medications).  Failure to inform my anesthesiologist about these medicines may increase my risk of anes _____________________________________________________________________________  Anesthesiologist Signature     Date   Time  I have discussed the procedure and information above with the patient (or patient’s representative) and answered their questions.  The

## (undated) NOTE — ED AVS SNAPSHOT
Jeniffer Renteria   MRN: [id number]    Department:  [facility] Emergency Department   Date of Visit:  10/16/2018           Disclosure     Insurance plans vary and the physician(s) referred by the ER may not be covered by your plan.  Please contact yo tell this physician (or your personal doctor if your instructions are to return to your personal doctor) about any new or lasting problems. The primary care or specialist physician will see patients referred from the [facility] Emergency Department.  Jami Saenz